# Patient Record
(demographics unavailable — no encounter records)

---

## 2025-05-20 NOTE — REASON FOR VISIT
[Initial] : an initial consultation for [FreeTextEntry2] : Consultation for contraception and to establish care

## 2025-05-20 NOTE — HISTORY OF PRESENT ILLNESS
[FreeTextEntry1] : 17-year-old female G0 presenting office to establish care with a new GYN provider and for consultation regarding contraception.  She has never been on contraception prior.  She is sexually active.  Denies prior obstetric history.  Denies gynecologic history of uterine fibroids, ovarian cyst, STIs.  Menstruation beginning at the age of 12 occurring regularly lasting proxy 5 days which do not affect her life.  Denies medical history.  Surgical history of correction of a lazy eye, the right, when she was in the sixth grade.  Denies personal or family history of gynecologic, breast, colon cancer, bleeding disorders or blood dyscrasias.  Denies alcohol, tobacco, illicit drug use.  Denies allergies to medications.

## 2025-05-20 NOTE — PLAN
[FreeTextEntry1] : Appointment to establish care with a new GYN provider and for consultation regarding initiation of contraception.  Risks, benefits, and alternatives of LARCs versus SARCs discussed at length and she is interested in a short acting reproductive contraceptive.  Rx for Loestrin sent to pharmacy with direction and she was directed on quick start protocol.  She will return to office in approximate 3 months time for follow-up.  She was given the opportunity to ask questions and all questions were addressed.  She understands the plan of care.

## 2025-06-03 NOTE — HISTORY OF PRESENT ILLNESS
[FreeTextEntry1] : 17 yo here c/o suprapubic burning , worse with urination,over the past 1 week. She was seen twice, this past saturday, [facility] started her on Macrobid. Pt has not felt the sharp pain lately. She denies urinary frequency. She has no trouble with BM, no fever, chills.

## 2025-06-03 NOTE — PLAN
[FreeTextEntry1] : Urinary burning- pt currently taking macrobid with some improvements super culture done today  rt if sx worsen I told pt to call city md and get official results of urine culture

## 2025-06-03 NOTE — PHYSICAL EXAM
[Chaperone Declined] : Chaperone offered however refused by patient, [Appropriately responsive] : appropriately responsive [Alert] : alert [No Acute Distress] : no acute distress [Oriented x3] : oriented x3 [Labia Majora] : normal [Labia Minora] : normal [Normal] : normal [Uterine Adnexae] : normal